# Patient Record
Sex: FEMALE | Race: WHITE | NOT HISPANIC OR LATINO | URBAN - METROPOLITAN AREA
[De-identification: names, ages, dates, MRNs, and addresses within clinical notes are randomized per-mention and may not be internally consistent; named-entity substitution may affect disease eponyms.]

---

## 2024-04-02 ENCOUNTER — OFFICE VISIT (OUTPATIENT)
Dept: ORTHOPEDIC SURGERY | Facility: CLINIC | Age: 39
End: 2024-04-02
Payer: COMMERCIAL

## 2024-04-02 VITALS — WEIGHT: 138 LBS | BODY MASS INDEX: 23.56 KG/M2 | HEIGHT: 64 IN

## 2024-04-02 DIAGNOSIS — M25.561 ACUTE PAIN OF RIGHT KNEE: Primary | ICD-10-CM

## 2024-04-02 DIAGNOSIS — S39.012A LUMBAR SPINE STRAIN, INITIAL ENCOUNTER: ICD-10-CM

## 2024-04-02 PROCEDURE — 1036F TOBACCO NON-USER: CPT | Performed by: ORTHOPAEDIC SURGERY

## 2024-04-02 PROCEDURE — 99203 OFFICE O/P NEW LOW 30 MIN: CPT | Performed by: ORTHOPAEDIC SURGERY

## 2024-04-02 RX ORDER — IBUPROFEN 400 MG/1
1 TABLET ORAL EVERY 8 HOURS PRN
COMMUNITY
Start: 2024-03-27

## 2024-04-02 NOTE — PROGRESS NOTES
39-year-old is seen with right knee pain and low back pain.  She has been having a persistent moderate throbbing pain along the anterior aspect the knee occasionally on the medial side.  She has been having right-sided lumbar paraspinal discomfort.  She has had symptoms in both for about a month.  She has had discomfort exercising which she stopped.  She was participating in [employer].  She has a desk job.  She has used ibuprofen and Robaxin and applied ice and heat.    Pleasant and no acute distress. Walks with a mildly antalgic gait. There is decreased flexibility of the lumbosacral spine. There is tenderness on the lumbar paraspinal muscles. Bilateral lower extremity quadriceps, hamstring, gastrocsoleus, tibialis anterior strength are intact. Sensation to light touch is intact.  Both lower extremities are well perfused the skin is intact and muscle tone is adequate.  Both knees have no effusion and a range of motion of 0 to 125 degrees.  There is patellofemoral crepitus and tenderness on the right.  Mild medial joint line tenderness.  Both lower extremities are well-perfused the skin is intact and muscle tone is adequate.  Tha's is positive with discomfort in the right knee.    Multiple x-ray views of the right knee are personally reviewed and there is no acute bony normality.    Multiple x-ray views of the lumbosacral spine are personally reviewed and there is no acute bony abnormality.    Discussion about the lumbar strain and patellofemoral knee pain was discussed.  She will perform physical therapy for both.  She can use ibuprofen and apply ice or heat and avoid aggravating activities.  If she has persistent knee pain over the next 6 weeks and she would be sent for MRI to assess for a medial meniscus tear.  If she has persistent back pain then she would likely be referred to pain management.

## 2024-04-03 DIAGNOSIS — S39.012A LUMBAR SPINE STRAIN, INITIAL ENCOUNTER: ICD-10-CM

## 2024-04-03 DIAGNOSIS — M25.561 ACUTE PAIN OF RIGHT KNEE: Primary | ICD-10-CM

## 2024-04-22 ENCOUNTER — APPOINTMENT (OUTPATIENT)
Dept: PHYSICAL THERAPY | Facility: CLINIC | Age: 39
End: 2024-04-22
Payer: COMMERCIAL

## 2024-05-01 ENCOUNTER — APPOINTMENT (OUTPATIENT)
Dept: PHYSICAL THERAPY | Facility: CLINIC | Age: 39
End: 2024-05-01
Payer: COMMERCIAL

## 2024-05-24 ENCOUNTER — TELEPHONE (OUTPATIENT)
Dept: SURGICAL ONCOLOGY | Facility: CLINIC | Age: 39
End: 2024-05-24
Payer: COMMERCIAL

## 2024-05-24 NOTE — TELEPHONE ENCOUNTER
Telephone patient to assist in scheduling [facility] breast consultation however received her voicemail. Message left to contact office for scheduling.